# Patient Record
Sex: MALE | Race: WHITE | NOT HISPANIC OR LATINO | URBAN - METROPOLITAN AREA
[De-identification: names, ages, dates, MRNs, and addresses within clinical notes are randomized per-mention and may not be internally consistent; named-entity substitution may affect disease eponyms.]

---

## 2018-01-12 ENCOUNTER — IMPORTED ENCOUNTER (OUTPATIENT)
Dept: URBAN - METROPOLITAN AREA CLINIC 31 | Facility: CLINIC | Age: 58
End: 2018-01-12

## 2018-01-12 PROBLEM — H11.151: Noted: 2018-01-12

## 2018-01-12 PROCEDURE — 92015 DETERMINE REFRACTIVE STATE: CPT

## 2018-01-12 PROCEDURE — 92014 COMPRE OPH EXAM EST PT 1/>: CPT

## 2018-01-12 PROCEDURE — 92310 CONTACT LENS FITTING OU: CPT

## 2018-01-12 NOTE — PATIENT DISCUSSION
1.   Pinguecula OD --Reviewed that growth is caused by the cumulative effect of sun exposure over time and that it does not extend on to the cornea. Recommend UV protection to prevent progression and artificial tears prn for comfort. Return for continued monitoring. 2. Refractive error - glasses change optional. Has successfully worn only the OD MF lens and no lens OS for near Can continue. DW X 1M. 3. Return for an appointment in 1 year for comprehensive exam. with Dr. Gaye Ruano.

## 2019-01-18 ENCOUNTER — IMPORTED ENCOUNTER (OUTPATIENT)
Dept: URBAN - METROPOLITAN AREA CLINIC 31 | Facility: CLINIC | Age: 59
End: 2019-01-18

## 2019-01-18 PROBLEM — H10.33: Noted: 2019-01-18

## 2019-01-18 PROBLEM — H11.153: Noted: 2019-01-18

## 2019-01-18 PROBLEM — H11.151: Noted: 2019-01-18

## 2019-01-18 PROCEDURE — 92310 CONTACT LENS FITTING OU: CPT

## 2019-01-18 PROCEDURE — 92014 COMPRE OPH EXAM EST PT 1/>: CPT

## 2019-01-18 NOTE — PATIENT DISCUSSION
Judith OD --Reviewed that growth is caused by the cumulative effect of sun exposure over time and that it does not extend on to the cornea. Recommend UV protection to prevent progression and artificial tears prn for comfort. Return for continued monitoring.

## 2019-01-18 NOTE — PATIENT DISCUSSION
1.  Non-specific Conjunctivitis OU -- The condition was discussed with patient. 2. Refractive error - Continue with MF CL OD and no lens OS for near. 3. Pinguecula OD --Reviewed that growth is caused by the cumulative effect of sun exposure over time and that it does not extend on to the cornea. Recommend UV protection to prevent progression and artificial tears prn for comfort. Return for continued monitoring. 4. Return for an appointment in 1 year for comprehensive exam. with Dr. Nkechi Campbell.

## 2019-01-18 NOTE — PATIENT DISCUSSION
Judith WRIGHT --Reviewed that growth is caused by the cumulative effect of sun exposure over time and that it does not extend on to the cornea. Recommend UV protection to prevent progression and artificial tears prn for comfort. Return for continued monitoring.

## 2020-01-14 ENCOUNTER — IMPORTED ENCOUNTER (OUTPATIENT)
Dept: URBAN - METROPOLITAN AREA CLINIC 31 | Facility: CLINIC | Age: 60
End: 2020-01-14

## 2020-01-14 PROBLEM — S05.01XA: Noted: 2020-01-14

## 2020-01-14 PROCEDURE — 99213 OFFICE O/P EST LOW 20 MIN: CPT

## 2020-01-14 NOTE — PATIENT DISCUSSION
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 Topical antibiotic bryson prescribed. Erythromycin OD TID for 2 days. then nightly for a month. No CL for 2 days. F/U one week in [facility] with Dr. Paola Betancourt.

## 2020-01-20 ENCOUNTER — IMPORTED ENCOUNTER (OUTPATIENT)
Dept: URBAN - METROPOLITAN AREA CLINIC 31 | Facility: CLINIC | Age: 60
End: 2020-01-20

## 2020-01-20 PROBLEM — H18.831: Noted: 2020-01-20

## 2020-01-20 PROCEDURE — 92015 DETERMINE REFRACTIVE STATE: CPT

## 2020-01-20 PROCEDURE — 92014 COMPRE OPH EXAM EST PT 1/>: CPT

## 2020-01-20 PROCEDURE — 92310 CONTACT LENS FITTING OU: CPT

## 2020-01-20 NOTE — PATIENT DISCUSSION
1.  Recurrent Erosion of Cornea OD -  healed. Continue E bryson HS until finished and then switch to AT bryson HS and rewetting gtt when CL on.2. Refractive error - Continue present contact lens modality OD. Stop/wear and call if any redness pain or decrease in vision occur. No glasses change. 3.  Return for an appointment in 1 year for comprehensive exam. with Dr. Gladys Ramsey.

## 2020-01-20 NOTE — PATIENT DISCUSSION
Refractive error - Continue present contact lens modality OD. Stop/wear and call if any redness pain or decrease in vision occur. No glasses change.

## 2021-01-26 ENCOUNTER — IMPORTED ENCOUNTER (OUTPATIENT)
Dept: URBAN - METROPOLITAN AREA CLINIC 31 | Facility: CLINIC | Age: 61
End: 2021-01-26

## 2021-01-26 PROBLEM — H18.831: Noted: 2021-01-26

## 2021-01-26 PROBLEM — H11.151: Noted: 2021-01-26

## 2021-01-26 PROCEDURE — 99213 OFFICE O/P EST LOW 20 MIN: CPT

## 2021-01-26 PROCEDURE — 92071 CONTACT LENS FITTING FOR TX: CPT

## 2021-01-28 ENCOUNTER — IMPORTED ENCOUNTER (OUTPATIENT)
Dept: URBAN - METROPOLITAN AREA CLINIC 31 | Facility: CLINIC | Age: 61
End: 2021-01-28

## 2021-01-28 PROBLEM — H11.151: Noted: 2021-01-28

## 2021-01-28 PROBLEM — H18.831: Noted: 2021-01-28

## 2021-01-28 PROCEDURE — 99213 OFFICE O/P EST LOW 20 MIN: CPT

## 2021-01-28 NOTE — PATIENT DISCUSSION
1.  Recurrent Erosion of Cornea OD -  pain upon opening eyes. Hung 128ung qhs2. Pinguecula OD --Reviewed that growth is caused by the cumulative effect of sun exposure over time and that it does not extend on to the cornea. Recommend UV protection to prevent progression and artificial tears prn for comfort. Return for continued monitoring.

## 2021-05-18 ENCOUNTER — IMPORTED ENCOUNTER (OUTPATIENT)
Dept: URBAN - METROPOLITAN AREA CLINIC 31 | Facility: CLINIC | Age: 61
End: 2021-05-18

## 2021-05-18 PROBLEM — T15.12XA: Noted: 2021-05-18

## 2021-05-18 PROCEDURE — 99213 OFFICE O/P EST LOW 20 MIN: CPT

## 2021-05-18 NOTE — PATIENT DISCUSSION
1.  A conjunctival foreign body was present OS -- Blinked out in exam chair. No remaining FB with eversion. Rewetting gtt Q2H X 2D. 2. Return for an appointment in 1 month for comprehensive exam. with Dr. Betsy Rutledge.

## 2021-06-11 ENCOUNTER — IMPORTED ENCOUNTER (OUTPATIENT)
Dept: URBAN - METROPOLITAN AREA CLINIC 31 | Facility: CLINIC | Age: 61
End: 2021-06-11

## 2021-06-11 PROCEDURE — 92014 COMPRE OPH EXAM EST PT 1/>: CPT

## 2021-12-06 ENCOUNTER — IMPORTED ENCOUNTER (OUTPATIENT)
Dept: URBAN - METROPOLITAN AREA CLINIC 31 | Facility: CLINIC | Age: 61
End: 2021-12-06

## 2021-12-06 PROBLEM — H11.151: Noted: 2021-12-06

## 2021-12-06 PROBLEM — H43.811: Noted: 2021-12-06

## 2021-12-06 PROCEDURE — 99214 OFFICE O/P EST MOD 30 MIN: CPT

## 2021-12-06 PROCEDURE — 92250 FUNDUS PHOTOGRAPHY W/I&R: CPT

## 2021-12-06 NOTE — PATIENT DISCUSSION
1.  PVD OD: Patient was cautioned to call our office immediately if they experience a substantial change in their symptoms such as an increase in floaters persistent flashes loss of visual field (may appear as a shadow or a curtain) or decrease in visual acuity as these may indicate a retinal tear or detachment. If this is a new problem patient will need to return for re-examination  as determined by the [address]. Pinguecula OD --Reviewed that growth is caused by the cumulative effect of sun exposure over time and that it does not extend on to the cornea. Recommend UV protection to prevent progression and artificial tears prn for comfort. Return for continued monitoring. 3. Return for an appointment in 6 months for comprehensive exam. with Dr. Pili Boucher

## 2022-04-02 ASSESSMENT — VISUAL ACUITY
OD_SC: 20/20
OS_SC: 20/25
OD_CC: 20/70
OS_CC: 20/50
OS_PH: SC 20/20
OD_CC: 20/30-2
OD_SC: 20/20
OD_PH: SC 20/20
OS_SC: 20/20
OS_CC: 20/50
OD_SC: 20/20-3
OS_CC: 20/100
OS_PH: SC 20/20
OS_SC: 20/20-2

## 2022-04-02 ASSESSMENT — TONOMETRY
OD_IOP_MMHG: 16
OD_IOP_MMHG: 14
OD_IOP_MMHG: 13
OS_IOP_MMHG: 13
OS_IOP_MMHG: 13
OS_IOP_MMHG: 14

## 2022-06-17 ENCOUNTER — COMPREHENSIVE EXAM (OUTPATIENT)
Dept: URBAN - METROPOLITAN AREA CLINIC 29 | Facility: CLINIC | Age: 62
End: 2022-06-17

## 2022-06-17 DIAGNOSIS — H52.13: ICD-10-CM

## 2022-06-17 DIAGNOSIS — H52.203: ICD-10-CM

## 2022-06-17 PROCEDURE — 92310-4 LEVEL 4 CONTACT LENS MANAGEMENT

## 2022-06-17 PROCEDURE — 92014 COMPRE OPH EXAM EST PT 1/>: CPT

## 2022-06-17 ASSESSMENT — TONOMETRY
OD_IOP_MMHG: 10
OS_IOP_MMHG: 10

## 2022-06-17 ASSESSMENT — VISUAL ACUITY
OS_SC: J1
OD_CC: 20/25

## 2022-07-09 ENCOUNTER — TELEPHONE ENCOUNTER (OUTPATIENT)
Dept: URBAN - METROPOLITAN AREA CLINIC 121 | Facility: CLINIC | Age: 62
End: 2022-07-09

## 2022-07-10 ENCOUNTER — TELEPHONE ENCOUNTER (OUTPATIENT)
Dept: URBAN - METROPOLITAN AREA CLINIC 121 | Facility: CLINIC | Age: 62
End: 2022-07-10

## 2023-06-20 ENCOUNTER — COMPREHENSIVE EXAM (OUTPATIENT)
Dept: URBAN - METROPOLITAN AREA CLINIC 29 | Facility: CLINIC | Age: 63
End: 2023-06-20

## 2023-06-20 DIAGNOSIS — H52.203: ICD-10-CM

## 2023-06-20 DIAGNOSIS — H52.13: ICD-10-CM

## 2023-06-20 PROCEDURE — 92014 COMPRE OPH EXAM EST PT 1/>: CPT

## 2023-06-20 PROCEDURE — 92015 DETERMINE REFRACTIVE STATE: CPT

## 2023-06-20 PROCEDURE — 92310-2 LEVEL 2 CONTACT LENS MANAGEMENT

## 2023-06-20 ASSESSMENT — VISUAL ACUITY
OS_CC: 20/20
OD_CC: 20/20-1
OS_CC: 20/20-2
OD_SC: 20/50-2
OS_SC: 20/50+2
OD_CC: 20/20-1

## 2024-06-24 ENCOUNTER — COMPREHENSIVE EXAM (OUTPATIENT)
Dept: URBAN - METROPOLITAN AREA CLINIC 29 | Facility: CLINIC | Age: 64
End: 2024-06-24

## 2024-06-24 DIAGNOSIS — H52.13: ICD-10-CM

## 2024-06-24 DIAGNOSIS — H52.223: ICD-10-CM

## 2024-06-24 PROCEDURE — 92310-1 LEVEL 1 CONTACT LENS MANAGEMENT: Mod: 21

## 2024-06-24 PROCEDURE — 92014 COMPRE OPH EXAM EST PT 1/>: CPT

## 2024-06-24 PROCEDURE — 92015 DETERMINE REFRACTIVE STATE: CPT

## 2024-06-24 ASSESSMENT — VISUAL ACUITY
OD_CC: 20/20
OS_SC: 20/50

## 2024-06-24 ASSESSMENT — TONOMETRY
OS_IOP_MMHG: 10
OD_IOP_MMHG: 10

## 2025-06-25 ENCOUNTER — COMPREHENSIVE EXAM (OUTPATIENT)
Age: 65
End: 2025-06-25

## 2025-06-25 DIAGNOSIS — H52.13: ICD-10-CM

## 2025-06-25 DIAGNOSIS — H52.223: ICD-10-CM

## 2025-06-25 PROCEDURE — 92014 COMPRE OPH EXAM EST PT 1/>: CPT

## 2025-06-25 PROCEDURE — 92310-1 LEVEL 1 SOFT LENS UPDATE: Mod: 21

## 2025-06-25 PROCEDURE — 92015 DETERMINE REFRACTIVE STATE: CPT
